# Patient Record
(demographics unavailable — no encounter records)

---

## 2025-06-13 NOTE — HISTORY OF PRESENT ILLNESS
[Normal] : Normal [In Bassinet/Crib] : sleeps in bassinet/crib [On back] : sleeps on back [No] : No cigarette smoke exposure [Hepatitis B Vaccine Given] : Hepatitis B vaccine given [Breast milk] : breast milk [Formula ___ oz/feed] : [unfilled] oz of formula per feed [FreeTextEntry8] : Date/Time of Birth 2025 17:02 Gestational Age at Birth (WEEKS) 39.1 Week(s) Admission Weight (GRAMS) 3110 Gm Admission Height (CENTIMETERS) 51 cm Head Circumference (centimeters): 33 Discharge Date 10-Dionte-2025 Discharge Information Weight (grams): 2980 % weight change = -4.18%  Hospital Course Baby is a 39.1wk AGA female born to a 37y/o  mother via . Peds called to delivery for category 2 FHRT, precipitous delivery. Maternal history hypothyroid (no history of grave's, on synthroid). Pregnancy c/b AMA. Maternal blood type O+. PNL: HIV neg/RPR NR/HBsAg neg/rubella immune. GBS negative on . AROM at 1633 on  clear fluids (30min). Highest maternal temp not taken. EOS: n/a. Baby born vigorous and crying spontaneously. Warmed, dried, stimulated. Apgars 8/9. Mom plans to breastfeed and consents hepB.  Since admission to the mother baby unit, baby has been feeding, voiding, and stooling appropriately. Vitals remained stable during admission. Baby received routine  care.  Discharge Bilirubin 6.1 at 24 hours of life, which is below phototherapy threshold [Co-sleeping] : no co-sleeping [Loose bedding, pillow, toys, and/or bumpers in crib] : no loose bedding, pillow, toys, and/or bumpers in crib [Exposure to electronic nicotine delivery system] : No exposure to electronic nicotine delivery system [FreeTextEntry1] : [place] well visit

## 2025-06-13 NOTE — PHYSICAL EXAM
[Alert] : alert [Acute Distress] : no acute distress [Normocephalic] : normocephalic [Flat Open Anterior [place]] : flat open anterior fontanelle [Icteric sclera] : icteric sclera [PERRL] : PERRL [Red Reflex Bilateral] : red reflex bilateral [Normally Placed Ears] : normally placed ears [Auricles Well Formed] : auricles well formed [Clear Tympanic membranes] : clear tympanic membranes [Light reflex present] : light reflex present [Bony structures visible] : bony structures visible [Patent Auditory Canal] : patent auditory canal [Discharge] : no discharge [Nares Patent] : nares patent [Palate Intact] : palate intact [Uvula Midline] : uvula midline [Supple, full passive range of motion] : supple, full passive range of motion [Palpable Masses] : no palpable masses [Symmetric Chest Rise] : symmetric chest rise [Clear to Auscultation Bilaterally] : clear to auscultation bilaterally [Regular Rate and Rhythm] : regular rate and rhythm [S1, S2 present] : S1, S2 present [Murmurs] : no murmurs [+2 Femoral Pulses] : +2 femoral pulses [Soft] : soft [Tender] : nontender [Distended] : not distended [Bowel Sounds] : bowel sounds present [Umbilical Stump Dry, Clean, Intact] : umbilical stump dry, clean, intact [Hepatomegaly] : no hepatomegaly [Splenomegaly] : no splenomegaly [Normal external genitalia] : normal external genitalia [Clitoromegaly] : no clitoromegaly [Patent Vagina] : patent vagina [Patent] : patent [Normally Placed] : normally placed [No Abnormal Lymph Nodes Palpated] : no abnormal lymph nodes palpated [Carreno-Ortolani] : negative Carreno-Ortolani [Symmetric Flexed Extremities] : symmetric flexed extremities [Spinal Dimple] : no spinal dimple [Tuft of Hair] : no tuft of hair [Startle Reflex] : startle reflex present [Suck Reflex] : suck reflex present [Rooting] : rooting reflex present [Palmar Grasp] : palmar grasp present [Plantar Grasp] : plantar reflex present [Symmetric [place]] : symmetric [place] [Jaundice] : jaundice

## 2025-06-19 NOTE — HISTORY OF PRESENT ILLNESS
[FreeTextEntry6] : Breastfeeding ad trev and supplementing with formula.  Making 6-8 wet diapers/stool.

## 2025-07-14 NOTE — PHYSICAL EXAM

## 2025-07-14 NOTE — HISTORY OF PRESENT ILLNESS
[Mother] : mother [Breast milk] : breast milk [Formula ___ oz/feed] : [unfilled] oz of formula per feed [Normal] : Normal [In Bassinet/Crib] : sleeps in bassinet/crib [Pacifier use] : Pacifier use [No] : No cigarette smoke exposure [Water heater temperature set at <120 degrees F] : Water heater temperature set at <120 degrees F [Rear facing car seat in back seat] : Rear facing car seat in back seat [Carbon Monoxide Detectors] : Carbon monoxide detectors at home [Smoke Detectors] : Smoke detectors at home. [At risk for exposure to TB] : Not at risk for exposure to Tuberculosis  [NO] : No [FreeTextEntry1] : WELL VISIT ONE MONTH OLD

## 2025-07-14 NOTE — DISCUSSION/SUMMARY
[Normal Growth] : growth [Normal Development] : development  [No Elimination Concerns] : elimination [Continue Regimen] : feeding [No Skin Concerns] : skin [Normal Sleep Pattern] : sleep [None] : no medical problems [Anticipatory Guidance Given] : Anticipatory guidance addressed as per the history of present illness section [Parental Well-Being] : parental well-being [Family Adjustment] : family adjustment [Feeding Routines] : feeding routines [Infant Adjustment] : infant adjustment [Safety] : safety [Age Approp Vaccines] : Age appropriate vaccines administered [No Medications] : ~He/She~ is not on any medications [Parent/Guardian] : Parent/Guardian [FreeTextEntry1] : Discussed and/or provided information on the following: PARENTAL WELL-BEING: Health (maternal postpartum checkup, depression, substance abuse); return to work/school (breastfeeding plans, ) FAMILY ADJUSTMENT: Family resources; family support; parent roles; domestic violence; community resources INFANT ADJUSTMENT: Sleep/wake schedule, sleep position (back to sleep, location, crib safety); state modulation (crying, consoling, shaken baby); developmental changes (bored baby, tummy time); early development referrals NUTRITION: Feeding frequency (growth spurts); feeding choices (types of foods/fluids); hunger cues; feeding strategies (holding, burping); pacifier use (cleanliness); feeding guidance (breastfeeding, formula) SAFETY: Car seats; toys with loops and strings; falls; tobacco smoke